# Patient Record
Sex: FEMALE | Race: WHITE | NOT HISPANIC OR LATINO
[De-identification: names, ages, dates, MRNs, and addresses within clinical notes are randomized per-mention and may not be internally consistent; named-entity substitution may affect disease eponyms.]

---

## 2017-08-01 ENCOUNTER — APPOINTMENT (OUTPATIENT)
Dept: BREAST CENTER | Facility: CLINIC | Age: 72
End: 2017-08-01
Payer: MEDICARE

## 2017-08-01 VITALS
WEIGHT: 128 LBS | BODY MASS INDEX: 21.59 KG/M2 | HEIGHT: 64.5 IN | SYSTOLIC BLOOD PRESSURE: 110 MMHG | HEART RATE: 68 BPM | DIASTOLIC BLOOD PRESSURE: 84 MMHG

## 2017-08-01 PROCEDURE — 99213 OFFICE O/P EST LOW 20 MIN: CPT

## 2017-08-01 RX ORDER — BIOTIN 10 MG
TABLET ORAL
Refills: 0 | Status: ACTIVE | COMMUNITY

## 2017-08-05 ENCOUNTER — TRANSCRIPTION ENCOUNTER (OUTPATIENT)
Age: 72
End: 2017-08-05

## 2017-12-15 ENCOUNTER — RESULT REVIEW (OUTPATIENT)
Age: 72
End: 2017-12-15

## 2018-02-07 ENCOUNTER — RESULT REVIEW (OUTPATIENT)
Age: 73
End: 2018-02-07

## 2019-02-23 ENCOUNTER — EMERGENCY (EMERGENCY)
Facility: HOSPITAL | Age: 74
LOS: 1 days | Discharge: HOME | End: 2019-02-23
Attending: EMERGENCY MEDICINE
Payer: MEDICARE

## 2019-02-23 VITALS
TEMPERATURE: 99 F | OXYGEN SATURATION: 100 % | HEART RATE: 97 BPM | RESPIRATION RATE: 18 BRPM | SYSTOLIC BLOOD PRESSURE: 124 MMHG | DIASTOLIC BLOOD PRESSURE: 61 MMHG

## 2019-02-23 VITALS
OXYGEN SATURATION: 97 % | HEART RATE: 101 BPM | SYSTOLIC BLOOD PRESSURE: 158 MMHG | TEMPERATURE: 98 F | DIASTOLIC BLOOD PRESSURE: 82 MMHG | RESPIRATION RATE: 13 BRPM

## 2019-02-23 LAB
ALBUMIN SERPL ELPH-MCNC: 4.7 G/DL — SIGNIFICANT CHANGE UP (ref 3.3–5)
ALP SERPL-CCNC: 41 U/L — SIGNIFICANT CHANGE UP (ref 40–120)
ALT FLD-CCNC: 32 U/L — SIGNIFICANT CHANGE UP (ref 10–45)
ANION GAP SERPL CALC-SCNC: 15 MMOL/L — SIGNIFICANT CHANGE UP (ref 5–17)
APTT BLD: 32.1 SEC — SIGNIFICANT CHANGE UP (ref 27.5–36.3)
AST SERPL-CCNC: 30 U/L — SIGNIFICANT CHANGE UP (ref 10–40)
BASOPHILS # BLD AUTO: 0 K/UL — SIGNIFICANT CHANGE UP (ref 0–0.2)
BASOPHILS NFR BLD AUTO: 0.9 % — SIGNIFICANT CHANGE UP (ref 0–2)
BILIRUB SERPL-MCNC: 0.5 MG/DL — SIGNIFICANT CHANGE UP (ref 0.2–1.2)
BUN SERPL-MCNC: 13 MG/DL — SIGNIFICANT CHANGE UP (ref 7–23)
CALCIUM SERPL-MCNC: 10.8 MG/DL — HIGH (ref 8.4–10.5)
CHLORIDE SERPL-SCNC: 102 MMOL/L — SIGNIFICANT CHANGE UP (ref 96–108)
CO2 SERPL-SCNC: 26 MMOL/L — SIGNIFICANT CHANGE UP (ref 22–31)
CREAT SERPL-MCNC: 0.73 MG/DL — SIGNIFICANT CHANGE UP (ref 0.5–1.3)
EOSINOPHIL # BLD AUTO: 0.1 K/UL — SIGNIFICANT CHANGE UP (ref 0–0.5)
EOSINOPHIL NFR BLD AUTO: 2.6 % — SIGNIFICANT CHANGE UP (ref 0–6)
GLUCOSE SERPL-MCNC: 120 MG/DL — HIGH (ref 70–99)
HCT VFR BLD CALC: 43.6 % — SIGNIFICANT CHANGE UP (ref 34.5–45)
HGB BLD-MCNC: 14.9 G/DL — SIGNIFICANT CHANGE UP (ref 11.5–15.5)
INR BLD: 0.94 RATIO — SIGNIFICANT CHANGE UP (ref 0.88–1.16)
LYMPHOCYTES # BLD AUTO: 1.2 K/UL — SIGNIFICANT CHANGE UP (ref 1–3.3)
LYMPHOCYTES # BLD AUTO: 29 % — SIGNIFICANT CHANGE UP (ref 13–44)
MCHC RBC-ENTMCNC: 30.7 PG — SIGNIFICANT CHANGE UP (ref 27–34)
MCHC RBC-ENTMCNC: 34.2 GM/DL — SIGNIFICANT CHANGE UP (ref 32–36)
MCV RBC AUTO: 89.7 FL — SIGNIFICANT CHANGE UP (ref 80–100)
MONOCYTES # BLD AUTO: 0.4 K/UL — SIGNIFICANT CHANGE UP (ref 0–0.9)
MONOCYTES NFR BLD AUTO: 9.7 % — SIGNIFICANT CHANGE UP (ref 2–14)
NEUTROPHILS # BLD AUTO: 2.4 K/UL — SIGNIFICANT CHANGE UP (ref 1.8–7.4)
NEUTROPHILS NFR BLD AUTO: 57.8 % — SIGNIFICANT CHANGE UP (ref 43–77)
PLATELET # BLD AUTO: 180 K/UL — SIGNIFICANT CHANGE UP (ref 150–400)
POTASSIUM SERPL-MCNC: 3.7 MMOL/L — SIGNIFICANT CHANGE UP (ref 3.5–5.3)
POTASSIUM SERPL-SCNC: 3.7 MMOL/L — SIGNIFICANT CHANGE UP (ref 3.5–5.3)
PROT SERPL-MCNC: 8 G/DL — SIGNIFICANT CHANGE UP (ref 6–8.3)
PROTHROM AB SERPL-ACNC: 10.7 SEC — SIGNIFICANT CHANGE UP (ref 10–12.9)
RBC # BLD: 4.86 M/UL — SIGNIFICANT CHANGE UP (ref 3.8–5.2)
RBC # FLD: 12.2 % — SIGNIFICANT CHANGE UP (ref 10.3–14.5)
SODIUM SERPL-SCNC: 143 MMOL/L — SIGNIFICANT CHANGE UP (ref 135–145)
WBC # BLD: 4.1 K/UL — SIGNIFICANT CHANGE UP (ref 3.8–10.5)
WBC # FLD AUTO: 4.1 K/UL — SIGNIFICANT CHANGE UP (ref 3.8–10.5)

## 2019-02-23 PROCEDURE — 80053 COMPREHEN METABOLIC PANEL: CPT

## 2019-02-23 PROCEDURE — 70450 CT HEAD/BRAIN W/O DYE: CPT

## 2019-02-23 PROCEDURE — 70498 CT ANGIOGRAPHY NECK: CPT | Mod: 26

## 2019-02-23 PROCEDURE — 70498 CT ANGIOGRAPHY NECK: CPT

## 2019-02-23 PROCEDURE — 85610 PROTHROMBIN TIME: CPT

## 2019-02-23 PROCEDURE — 93010 ELECTROCARDIOGRAM REPORT: CPT

## 2019-02-23 PROCEDURE — 70496 CT ANGIOGRAPHY HEAD: CPT

## 2019-02-23 PROCEDURE — 85027 COMPLETE CBC AUTOMATED: CPT

## 2019-02-23 PROCEDURE — 70496 CT ANGIOGRAPHY HEAD: CPT | Mod: 26

## 2019-02-23 PROCEDURE — 99291 CRITICAL CARE FIRST HOUR: CPT | Mod: 25

## 2019-02-23 PROCEDURE — 99291 CRITICAL CARE FIRST HOUR: CPT

## 2019-02-23 PROCEDURE — 70450 CT HEAD/BRAIN W/O DYE: CPT | Mod: 26,59

## 2019-02-23 PROCEDURE — 85730 THROMBOPLASTIN TIME PARTIAL: CPT

## 2019-02-23 PROCEDURE — 93005 ELECTROCARDIOGRAM TRACING: CPT

## 2019-02-23 PROCEDURE — 82962 GLUCOSE BLOOD TEST: CPT

## 2019-02-23 NOTE — ED PROVIDER NOTE - OBJECTIVE STATEMENT
Resident: 73y F PM HLD presents with tongue curling. Per patient, she was seated at her desk at 10:30 when she noticed her tongue curling. Denies slurred speech, headache, visual changes, weakness, numbness or tingling. Went to urgent care, who sent her to emergency department for stroke eval.

## 2019-02-23 NOTE — ED PROVIDER NOTE - PROGRESS NOTE DETAILS
Resident: CTH and CTA negative. Per neurology, will dc home with neuro follow-up. Resident: CTH and CTA negative. Tolerating PO here. Per neurology, will dc home with neuro follow-up.

## 2019-02-23 NOTE — CONSULT NOTE ADULT - ASSESSMENT
73 year old RH  female with PMHx of HLD (on statin, compliant), depression/anxiety who presents to ED as code stroke at 1:15 PM on 2/23/19 for "curling of tongue" and bilateral outer tongue numbness, LKN was 1030 AM on 2/23/19, she noticed she was speaking on the phone and she was able to speak but she felt like her tongue was curling, sx resolved 1/2 hour later. She states she was able to say words properly, and her speech was fluent. No other focal deficits. She is not on any antiplatelets/anticoag, she has no stroke hx. No recent head injury, no headache, dizziness. She felt nauseous earlier but no vomiting, this has also subsided. She stated that she was very stressed when she was on the phone, was dealing with a customer-sensitive issue when this episode occured  She drinks socially only, quit smoking 40 years ago  NIHSS 0, MRS 0  Neurologic exam is unremarkable, nonfocal. Patient states her symptoms have resolved completely. She was given the option of staying in CDU and getting MRI, but would like to leave. Was explained that this is very likely due to anxiety and less likely stroke. She understood and will come back to ED or follow up in stroke clinic PRN.     Plan  [] follow up official read of CTA head and neck  [] continue home medications, no change  [] follow up PRN in stroke neuro clinic    D/w Dr Dandre Olivas

## 2019-02-23 NOTE — ED ADULT NURSE REASSESSMENT NOTE - NS ED NURSE REASSESS COMMENT FT1
Patient PO challenge per MD order, will reassess within appropriate timeframe. VSS patient updated on plan of care.

## 2019-02-23 NOTE — ED PROVIDER NOTE - NSFOLLOWUPINSTRUCTIONS_ED_ALL_ED_FT
Stay well-hydrated. Follow-up with your primary care doctor. Follow-up with neurology in 1-2 weeks. Return immediately to the emergency department if any worsening or concerning symptoms.

## 2019-02-23 NOTE — ED PROVIDER NOTE - CARE PROVIDER_API CALL
Nataliya Gann (NP; RN)  NP in Family Health  [address] [facility] [address], [facility] [address]  [place] [address]  Phone: [phone]  Fax: [phone]  Follow Up Time:

## 2019-02-23 NOTE — CONSULT NOTE ADULT - SUBJECTIVE AND OBJECTIVE BOX
HPI:  Patient is a 73 year old RH  female with PMHx of HLD, depression who presents to ED as code stroke at 1:15 PM on 2/23/19 for slurring of speech and bilateral outer tongue numbness, LKN was 1030 AM on 2/23/19, she noticed she was speaking on the phone and she was able to speak but she had slurring of speech which resolved approx half an hour later    MEDICATIONS  (STANDING):    MEDICATIONS  (PRN):    PAST MEDICAL & SURGICAL HISTORY:    FAMILY HISTORY:    Allergies    No Known Allergies    Intolerances        SHx - No smoking, No ETOH, No drug abuse    Review of Systems:  CONSTITUTIONAL:  No weight loss, fever, chills, weakness or fatigue.  HEENT:  Eyes:  No visual loss, blurred vision, double vision or yellow sclerae. Ears, Nose, Throat:  No hearing loss, sneezing, congestion, runny nose or sore throat.  CARDIOVASCULAR:  No chest pain, chest pressure or chest discomfort. No palpitations or edema.  GASTROINTESTINAL:  No anorexia, nausea, vomiting or diarrhea. No abdominal pain or blood.  NEUROLOGICAL: See HPI  MUSCULOSKELETAL:  No muscle, back pain, joint pain or stiffness.  PSYCHIATRIC:  No history of depression or anxiety.    Vital Signs Last 24 Hrs  T(C): --  T(F): --  HR: --  BP: --  BP(mean): --  RR: --  SpO2: --    General Exam:   General appearance: No acute distress                 Neurological Exam:  Mental Status: Orientated to self, date and place.    No dysarthria, aphasia or neglect.      Cranial Nerves: CN I - not tested.  PERRL, EOMI, VFF, no nystagmus or diplopia.    No facial asymmetry.  Hearing intact to finger rub bilaterally.     Motor:   Tone: normal.                  Strength:     [] Upper extremity                      Delt       Bicep    Tricep                                                  R         5/5        5/5        5/5       5/5                                               L          5/5        5/5        5/5       5/5  [] Lower extremity                       HF          KE          KF        DF         PF                                               R        5/5        5/5        5/5       5/5       5/5                                               L         5/5        5/5       5/5       5/5        5/5  Pronator drift: none                 Dysmetria: BL NL FTN  No truncal ataxia.    Tremor: No resting, postural or action tremor.  No myoclonus.    Sensation: intact to light touch, pinprick, vibration and proprioception    Deep Tendon Reflexes:   Right 2+ : BC, TC, BRD   Left 2+ : BC, TC, BRD  Right 2+ Knee, 1+ ankle  Left 2+ Knee, 1+ ankle    Toes flexor bilaterally  Gait: normal and stable.      Other:  Radiology  CT  MRI  EKG:  tele:  TTE:  EEG: HPI:  Patient is a 73 year old RH  female with PMHx of HLD, depression/anxiety who presents to ED as code stroke at 1:15 PM on 2/23/19 for "curling of tongue" and bilateral outer tongue numbness, LKN was 1030 AM on 2/23/19, she noticed she was speaking on the phone and she was able to speak but she felt like her tongue was curling, sx resolved 1/2 hour later. No other focal deficits. She is not on any antiplatelets/anticoag, she has no stroke hx. No recent head injury, no headache, dizziness. She felt nauseous earlier but no vomiting, this has also subsided.   She drinks socially only, quit smoking 40 years ago  NIHSS 0, MRS 0    MEDICATIONS  (STANDING):    MEDICATIONS  (PRN):    PAST MEDICAL & SURGICAL HISTORY:    FAMILY HISTORY:    Allergies    No Known Allergies    Intolerances        SHx - No smoking, No ETOH, No drug abuse    Review of Systems:  CONSTITUTIONAL:  No weight loss, fever, chills, weakness or fatigue.  HEENT:  Eyes:  No visual loss, blurred vision, double vision or yellow sclerae. Ears, Nose, Throat:  No hearing loss, sneezing, congestion, runny nose or sore throat.  CARDIOVASCULAR:  No chest pain, chest pressure or chest discomfort. No palpitations or edema.  GASTROINTESTINAL:  No anorexia, nausea, vomiting or diarrhea. No abdominal pain or blood.  NEUROLOGICAL: See HPI  MUSCULOSKELETAL:  No muscle, back pain, joint pain or stiffness.  PSYCHIATRIC:  No history of depression or anxiety.    Vital Signs Last 24 Hrs  T(C): --  T(F): --  HR: --  BP: --  BP(mean): --  RR: --  SpO2: --    General Exam:   General appearance: No acute distress                 Neurological Exam:  Mental Status: Orientated to self, date and place.    No dysarthria, aphasia or neglect.      Cranial Nerves: CN I - not tested.  PERRL, EOMI, VFF, no nystagmus or diplopia.    No facial asymmetry.  Hearing intact to finger rub bilaterally.     Motor:   Tone: normal.                  Strength:     [] Upper extremity                      Delt       Bicep    Tricep                                                  R         5/5        5/5        5/5       5/5                                               L          5/5        5/5        5/5       5/5  [] Lower extremity                       HF          KE          KF        DF         PF                                               R        5/5        5/5        5/5       5/5       5/5                                               L         5/5        5/5       5/5       5/5        5/5  Pronator drift: none                 Dysmetria: BL NL FTN  No truncal ataxia.    Tremor: No resting, postural or action tremor.  No myoclonus.    Sensation: intact to light touch, pinprick, vibration and proprioception    Deep Tendon Reflexes:   Right 2+ : BC, TC, BRD   Left 2+ : BC, TC, BRD  Right 2+ Knee, 1+ ankle  Left 2+ Knee, 1+ ankle    Toes flexor bilaterally  Gait: normal and stable.      Other:  Radiology  CT  MRI  EKG:  tele:  TTE:  EEG: HPI:  Patient is a 73 year old RH  female with PMHx of HLD, depression/anxiety who presents to ED as code stroke at 1:15 PM on 2/23/19 for "curling of tongue" and bilateral outer tongue numbness, LKN was 1030 AM on 2/23/19, she noticed she was speaking on the phone and she was able to speak but she felt like her tongue was curling, sx resolved 1/2 hour later. No other focal deficits. She is not on any antiplatelets/anticoag, she has no stroke hx. No recent head injury, no headache, dizziness. She felt nauseous earlier but no vomiting, this has also subsided. She stated that she was very stressed when she was on the phone, was dealing with a customer-sensitive issue when this episode occured  She drinks socially only, quit smoking 40 years ago  NIHSS 0, MRS 0    Allergies  No Known Allergies    Review of Systems:  CONSTITUTIONAL:  No weight loss, fever, chills, weakness or fatigue.  HEENT:  Eyes:  No visual loss, blurred vision, double vision or yellow sclerae. Ears, Nose, Throat:  No hearing loss, sneezing, congestion, runny nose or sore throat.  CARDIOVASCULAR:  No chest pain, chest pressure or chest discomfort. No palpitations or edema.  GASTROINTESTINAL:  No anorexia, nausea, vomiting or diarrhea. No abdominal pain or blood.  NEUROLOGICAL: See HPI  MUSCULOSKELETAL:  No muscle, back pain, joint pain or stiffness.  PSYCHIATRIC:  No history of depression or anxiety.    General Exam:   General appearance: No acute distress                 Neurological Exam:  Mental Status: Orientated to self, date and place.    No dysarthria, aphasia or neglect. Speech is fluent. She is able to say phrases and name objects, able to follow commands.       Cranial Nerves: CN I - not tested.  PERRL, EOMI, VFF, no nystagmus  No facial asymmetry.       Motor:   Tone: normal.                  Strength:     [] Upper extremity                      Delt       Bicep    Tricep                                                  R         5/5        5/5        5/5       5/5                                               L          5/5        5/5        5/5       5/5  [] Lower extremity                       HF          KE          KF        DF         PF                                               R        5/5        5/5        5/5       5/5       5/5                                               L         5/5        5/5       5/5       5/5        5/5  Pronator drift: none                 Dysmetria: BL NL FTN  No truncal ataxia.    Tremor: No resting, postural or action tremor.  No myoclonus.    Sensation: intact to light touch  Toes flexor bilaterally  Gait: normal and stable.      Other:  Radiology  CTh: negative

## 2019-02-23 NOTE — ED PROVIDER NOTE - PHYSICAL EXAMINATION
Resident:   Gen: well appearing, of stated age, no acute distress  Head: NC, AT  ENT: PERRL, MMM  Neck: supple with full ROM   Chest: CTAB, no retractions, rate normal, appears to breathe comfortably  Heart: RRR S1S2, No peripheral edema, bilateral pulses in arms and legs  Abd: Soft non-tender, no rebound or guarding  Back: No spinal deformity, no CVAT  Ext: Moving all 4 extremities without obvious impairment to ROM, no obvious weakness  Neuro: fluid speech, no focal deficits, normal sensation  Psych: No anxiety, depression or pressured speech noted  Skin: no urticaria, no diffuse rash

## 2019-02-23 NOTE — ED PROVIDER NOTE - NS ED ROS FT
Constitutional: no fever, no chills.  Eyes: no visual changes.  ENMT: no sore throat.  CV: no chest pain.  Resp: no cough, no shortness of breath.  GI: no abdominal pain, no nausea, no vomiting, no diarrhea.  : no dysuria, no hematuria.  MSK: no back pain, no neck pain.  Skin: no rashes.  Neuro: no headache, no loss of consciousness, no weakness, no numbness, no tingling.  Psych: no known mental health issues.  Endo: no diabetes, no thyroid trouble.

## 2019-02-23 NOTE — ED PROVIDER NOTE - ATTENDING CONTRIBUTION TO CARE
------------ATTENDING NOTE------------   RHD pt w/  sent to ED by Urgent Care for concerns of CVA, pt describes sudden difficult speaking due to tongue "feeling funny" at 11:30AM today, felt her words where mumbled, no inappropriate words, slight improving symptoms by ED arrival, NIHSS = 1 on ED arrival for slight dysarthria, Code Stroke on arrival, awaiting labs/imaging and close reassessments and Neuro recs -->  - Joe Guerrero MD   ---------------------------------------------- ------------ATTENDING NOTE------------   RHD pt w/  sent to ED by Urgent Care for concerns of CVA, pt describes sudden difficult speaking due to tongue "feeling funny" at 11:30AM today, felt her words where mumbled, no inappropriate words, slight improving symptoms by ED arrival, NIHSS = 1 on ED arrival for slight dysarthria, Code Stroke on arrival, awaiting labs/imaging and close reassessments and Neuro recs --> back to baseline, low suspicion for CVA per Neuro, nml VS, tolerating PO, nml oral exam, in depth dw all about ddx, tx, bonner, continued close outpt fu.  - Joe Guerrero MD   ----------------------------------------------

## 2019-02-23 NOTE — ED ADULT NURSE NOTE - OBJECTIVE STATEMENT
73 year old A&Ox3 female presents ambulatory to ED with steady gait complaining of an episode of slurred speech this morning at 1030 for approximately 30 minutes. Patient states that the slurring has since resolved but she continues to have numbness on either side of her tongue. Code stroke called at 1316. Patient to CT at 1325. Speech is coherent, intonation clear, PERRL +3R equal reactive bilaterally. 73 year old A&Ox3 female presents ambulatory to ED with steady gait complaining of an episode of slurred speech this morning at 1030 for approximately 30 minutes. Patient states that the slurring has since resolved but she continues to have numbness on either side of her tongue. Code stroke called at 1316. Patient to CT at 1325. Speech is coherent, intonation clear, PERRL +3R equal reactive bilaterally. Full ROM, sensation intact. Confirms left arm numbness, mild headache, denies CP, SOB, n/v/d, fevers, chills, blurry vision, dizziness, tingling in bilateral upper and lower extremities. Refer to stroke neuro sheet for further documentation. Patient and family updated on plan of care.

## 2019-02-23 NOTE — ED PROVIDER NOTE - NSFOLLOWUPCLINICS_GEN_ALL_ED_FT
[facility]  Neurology  [address] [place] [address] [address] [place] [address]  Phone: [phone]  Fax:   Follow Up Time:

## 2019-02-23 NOTE — ED ADULT NURSE NOTE - CHPI ED NUR SYMPTOMS NEG
no confusion/no dizziness/no nausea/no blurred vision/no weakness/no fever/no loss of consciousness/no change in level of consciousness/no vomiting

## 2019-03-22 ENCOUNTER — RESULT REVIEW (OUTPATIENT)
Age: 74
End: 2019-03-22

## 2019-09-13 ENCOUNTER — APPOINTMENT (OUTPATIENT)
Dept: BREAST CENTER | Facility: CLINIC | Age: 74
End: 2019-09-13
Payer: MEDICARE

## 2019-09-13 VITALS
DIASTOLIC BLOOD PRESSURE: 65 MMHG | HEART RATE: 60 BPM | HEIGHT: 64 IN | BODY MASS INDEX: 22.02 KG/M2 | SYSTOLIC BLOOD PRESSURE: 121 MMHG | WEIGHT: 129 LBS

## 2019-09-13 DIAGNOSIS — Z12.39 ENCOUNTER FOR OTHER SCREENING FOR MALIGNANT NEOPLASM OF BREAST: ICD-10-CM

## 2019-09-13 DIAGNOSIS — R22.9 LOCALIZED SWELLING, MASS AND LUMP, UNSPECIFIED: ICD-10-CM

## 2019-09-13 PROCEDURE — 99213 OFFICE O/P EST LOW 20 MIN: CPT

## 2019-09-13 RX ORDER — ESTRADIOL 0.1 MG/G
0.1 CREAM VAGINAL
Qty: 42 | Refills: 0 | Status: ACTIVE | COMMUNITY
Start: 2018-10-05

## 2019-09-13 NOTE — DATA REVIEWED
[FreeTextEntry1] : Bilateral mammogram with alberto 5/28/2019:  No evidence of malignancy.\par \par Bilateral breast ultrasound 5/28/2019:  Right axillary region stable 29p0l14 mm well circumscribed oval mass versus 2015.\par \par \par (The current images were reviewed on disc and returned to the patient.)\par \par

## 2019-09-13 NOTE — PHYSICAL EXAM
[Sclera nonicteric] : sclera nonicteric [Supple] : supple [No Supraclavicular Adenopathy] : no supraclavicular adenopathy [No Cervical Adenopathy] : no cervical adenopathy [No Thyromegaly] : no thyromegaly [Clear to Auscultation Bilat] : clear to auscultation bilaterally [Examined in the supine and seated position] : examined in the supine and seated position [No dominant masses] : no dominant masses left breast [No Nipple Retraction] : no left nipple retraction [No Nipple Discharge] : no left nipple discharge [Not Tender] : non-tender [No Axillary Lymphadenopathy] : no left axillary lymphadenopathy [Soft] : abdomen soft [No Palpable Masses] : no abdominal mass palpated [No Hepato-Splenomegaly] : no hepato-splenomegaly [de-identified] : 1 cm mobile oval mass upper lateral breast toward pectoral insertion without change.

## 2019-09-13 NOTE — HISTORY OF PRESENT ILLNESS
[FreeTextEntry1] : This is a 74 year old  [employer] female who was seen in March 2015 for a small lump near her right armpit that had been present for 4-5 years.  She thought it had gotten a little larger. An FNA showed bloody fluid and it was felt to likely be a hemangioma. She also had a finding on the left mammogram which was stable on follow-up.  \par \par She has no current complaints.\par \par

## 2019-09-13 NOTE — PAST MEDICAL HISTORY
[Menarche Age ____] : age at menarche was [unfilled] [Menopause Age____] : age at menopause was [unfilled] [Live Births ___] : P[unfilled]  [Total Preg ___] : G[unfilled] [Age At Live Birth ___] : Age at live birth: [unfilled] [FreeTextEntry8] : no [FreeTextEntry7] : 5 years

## 2020-09-09 ENCOUNTER — RESULT REVIEW (OUTPATIENT)
Age: 75
End: 2020-09-09

## 2021-09-14 ENCOUNTER — RESULT REVIEW (OUTPATIENT)
Age: 76
End: 2021-09-14

## 2022-09-16 ENCOUNTER — RESULT REVIEW (OUTPATIENT)
Age: 77
End: 2022-09-16

## 2022-10-11 ENCOUNTER — NON-APPOINTMENT (OUTPATIENT)
Age: 77
End: 2022-10-11

## 2022-10-11 ENCOUNTER — TRANSCRIPTION ENCOUNTER (OUTPATIENT)
Age: 77
End: 2022-10-11

## 2022-10-12 ENCOUNTER — APPOINTMENT (OUTPATIENT)
Dept: SURGERY | Facility: CLINIC | Age: 77
End: 2022-10-12
Payer: MEDICARE

## 2022-10-12 VITALS
HEIGHT: 64 IN | SYSTOLIC BLOOD PRESSURE: 120 MMHG | BODY MASS INDEX: 22.88 KG/M2 | OXYGEN SATURATION: 99 % | WEIGHT: 134 LBS | DIASTOLIC BLOOD PRESSURE: 76 MMHG | TEMPERATURE: 97 F | HEART RATE: 72 BPM

## 2022-10-12 DIAGNOSIS — K80.20 CALCULUS OF GALLBLADDER W/OUT CHOLECYSTITIS W/OUT OBSTRUCTION: ICD-10-CM

## 2022-10-12 PROCEDURE — 99204 OFFICE O/P NEW MOD 45 MIN: CPT

## 2022-10-12 RX ORDER — MELOXICAM 15 MG/1
15 TABLET ORAL
Qty: 30 | Refills: 0 | Status: DISCONTINUED | COMMUNITY
Start: 2019-07-21 | End: 2022-10-12

## 2022-10-12 RX ORDER — METHYLPREDNISOLONE 4 MG/1
4 TABLET ORAL
Qty: 21 | Refills: 0 | Status: DISCONTINUED | COMMUNITY
Start: 2019-07-21 | End: 2022-10-12

## 2022-10-12 RX ORDER — NITROFURANTOIN (MONOHYDRATE/MACROCRYSTALS) 25; 75 MG/1; MG/1
100 CAPSULE ORAL
Qty: 10 | Refills: 0 | Status: DISCONTINUED | COMMUNITY
Start: 2019-08-21 | End: 2022-10-12

## 2022-10-31 ENCOUNTER — APPOINTMENT (OUTPATIENT)
Dept: SURGERY | Facility: CLINIC | Age: 77
End: 2022-10-31

## 2022-10-31 VITALS
TEMPERATURE: 98.2 F | BODY MASS INDEX: 22.88 KG/M2 | DIASTOLIC BLOOD PRESSURE: 79 MMHG | SYSTOLIC BLOOD PRESSURE: 152 MMHG | OXYGEN SATURATION: 96 % | WEIGHT: 134 LBS | HEIGHT: 64 IN | HEART RATE: 79 BPM

## 2022-10-31 DIAGNOSIS — Z86.39 PERSONAL HISTORY OF OTHER ENDOCRINE, NUTRITIONAL AND METABOLIC DISEASE: ICD-10-CM

## 2022-10-31 PROCEDURE — 99202 OFFICE O/P NEW SF 15 MIN: CPT

## 2022-10-31 NOTE — ASSESSMENT
[FreeTextEntry1] : Pt had an MRI 2016 that showed a 7.4 cm hemangioma and the present sonogram shows a 1.6 cm liver lesion?\par I would therefore like to repeat her liver MRI now

## 2022-10-31 NOTE — PHYSICAL EXAM
[JVD] : no jugular venous distention  [Normal Thyroid] : the thyroid was normal [Carotid Bruits] : no carotid bruits [Normal Breath Sounds] : Normal breath sounds [Normal Heart Sounds] : normal heart sounds [Normal Rate and Rhythm] : normal rate and rhythm [No Rash or Lesion] : No rash or lesion [Alert] : alert [Oriented to Person] : oriented to person [Oriented to Place] : oriented to place [Oriented to Time] : oriented to time [Calm] : calm [de-identified] : well developed female in no distress [de-identified] : nonicteric  [de-identified] : without adenopathy [de-identified] : normal bowel sounds, without distension or tenderness at time of exam [de-identified] : without hernia [de-identified] : without calf pain or swelling
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
## 2022-11-03 ENCOUNTER — APPOINTMENT (OUTPATIENT)
Dept: SURGERY | Facility: CLINIC | Age: 77
End: 2022-11-03

## 2022-11-03 VITALS — TEMPERATURE: 98.1 F

## 2022-11-03 DIAGNOSIS — K80.12 CALCULUS OF GALLBLADDER WITH ACUTE AND CHRONIC CHOLECYSTITIS W/OUT OBSTRUCTION: ICD-10-CM

## 2022-11-03 DIAGNOSIS — R16.0 HEPATOMEGALY, NOT ELSEWHERE CLASSIFIED: ICD-10-CM

## 2022-11-03 PROCEDURE — 99214 OFFICE O/P EST MOD 30 MIN: CPT

## 2022-11-03 NOTE — ASSESSMENT
[FreeTextEntry1] : I have discussed with pt and her  that the MRI shows that her liver lesion is a benign hemangioma and her adrenal lesion also looks benign and therefore they both do not require any intervention.\par I have discussed that she should remain on a no fat diet to avoid further gallbladder attacks.\par I have also discussed the risks, benefits and options. Pt would like to schedule a robotic assisted laparoscopic cholecystectomy.

## 2022-11-03 NOTE — HISTORY OF PRESENT ILLNESS
OT IRP Treatment    Primary Rehabilitation Diagnosis: spinal cord injury        Planned Discharge Destination: Home     SUBJECTIVE: Subjective: Pt was agreeable to therapy (01/24/22 1400)       Patient's Personal Goal: return home     OBJECTIVE:  Precautions  Cervical Precautions: Yes (01/24/22 1400)  Other Precautions: fall (01/24/22 0830)  Precautions Comments: Therapist re-educated pt on spinal precautions (01/24/22 1100)    Pain Intensity  Numeric Rating Scale 0-10: 0  Faces Scale: 0    See below for current functional status overview.  See OT flowsheet for full details regarding the OT therapy provided.    Therapist wearing surgical mask during entire session.    Patient was wearing mask throughout duration of therapy session.         ASSESSMENT:    Treatment today focused on bed mobility, dressing, and functional transfers.  Patient is demonstrating fair progress supported by motivation to participate.  Patient limited at this time by decreased functional mobility and cervical precautions.  Patient will benefit from further skilled OT for continued training with use of AE for dressing and increasing activity tolerance to help the patient meet goal of completing some of his ADLs with as little assist as possible.           This patient participated in all scheduled occupational therapy time with this therapist today.     Education:     Education Provided  On this date, education was provided to patient regarding safe use of equipment, self-care activities and energy conservation/pacing strategies. The response to education was/were: Verbalizes understanding.    LONG TERM GOALS:    Dressing Discharge Goal 1: patient will perform UE dressing setup  Dressing Discharge Goal 2: patient will perform LE dressing MODA-POOJA  Toileting Discharge Goal 1: patient will perform toileting MODA-POOJA  Home Setting Transfer Discharge Goal 1: patient will perform toilet transfer POOJA     PLAN:   Continue skilled OT, including the  [de-identified] : cholecystitis and lithiasis, hx of hepatic mass(hemangioma?) following Treatment Interventions: ADL retraining;Functional transfer training;UE strengthening/ROM;Endurance training;Cognitive reorientation;Patient/Family training;Equipment eval/education (01/22/22 1300)   OT Frequency: 5 days/week;6 days/week;7 days/week (01/22/22 1300), Frequency Comments: 1-1.5hrs/day (01/22/22 1300)                  RECOMMENDATIONS FOR DISCHARGE:  Recommendations for Discharge: OT IL: Patient requires 24 HOUR assistance to perform mobility and/or ADLs safely, Patient requires  intermittent assistance to perform mobility and/or ADLs safely, Patient is appropriate for Occupational Therapy 1-3 times per week         PT/OT ADL Equipment for Discharge: has BSC, shower chair (01/22/22 1300)       FUNCTIONAL DATA OVERVIEW LAST 24 HOURS  ADLs   Self Cares/ADL's  Grooming Assistance: Supervision;Supine, bed (01/24/22 1100)  Grooming/Oral Hygiene Deficit: Supervision/Safety (01/24/22 1100)  Upper Body Dressing Assistance: Minimal Assist (Min) (01/24/22 1400)  Upper Body Dressing Deficit: Pull down in back;Pull around back (01/24/22 1400)  Lower Body Clothing Assistance: Total Assist - Dependent;Wheelchair (Mary Steady) (01/24/22 1400)  Footwear Assistance: Total Assist - Non-dependent (while supine in bed) (01/24/22 0830)  Lower Body Dressing Deficit: Supervision/Safety;Requires assistive device for steadying (spinal precaution (no bending)) (01/24/22 0830)  Dressing Equipment Used: Reacher (01/24/22 1400)  Self Cares/ADL's Comments #1: Pt educated on use of reacher to maintain cervical precautions during dressing tasks (01/24/22 1400)     Household mobility  Household Mobility  Rolling: Minimal Assist (Min) (01/24/22 1400)  Supine to Sit: Total Assist - Dependent (2 person A; helper 1 UB/trunk, helper 2 BLE) (01/24/22 1400)  Sit to Stand: Total Assist - Dependent (Mary Steady) (01/24/22 1400)  Sitting - Static: Minimal Assist (Min) (01/24/22 1400)  Sitting - Dynamic: Minimal Assist (Min) (01/24/22  1400)  Household Mobility Comments #1: Pt completed rolling onto his side while following log roll technique to increase bed mobility and increase awareness of spinal precautions (01/24/22 1100)     Home Management        Tolerance  OT Activity Tolerance  Activity Tolerance: 1:1 Activity to rest (01/24/22 1400)     Cognition        Vision  @FLOW ([id number])     Interventions  Other Interventions 1: Scategories game completed where pt was tasked to think of words begining with \"S\" that fit into 10 categories. Min verbal cues required for 3/10 categories to further assess pt's memory and problem solving (01/24/22 1100)             At the end of the therapy session, patient is in wheelchair with the following safety measures in place: alarm system in place/re-engaged and call light within reach.    Patient is located in the patient room and was handed off to RN. Patient's family was not present. .   [de-identified] : Pt is here to go over her Liver MRI that showed a Hemangioma and a benign adrenal lesion. She denies any further symptoms

## 2022-11-03 NOTE — PHYSICAL EXAM
[JVD] : no jugular venous distention  [Normal Thyroid] : the thyroid was normal [Carotid Bruits] : no carotid bruits [Normal Breath Sounds] : Normal breath sounds [Normal Heart Sounds] : normal heart sounds [Normal Rate and Rhythm] : normal rate and rhythm [No Rash or Lesion] : No rash or lesion [Alert] : alert [Oriented to Person] : oriented to person [Oriented to Place] : oriented to place [Oriented to Time] : oriented to time [Calm] : calm [de-identified] : well developed white female in no distress [de-identified] : nonicteric and noninjected [de-identified] : without adenopathy [de-identified] : normal bowel sounds, without distension or tenderness at time of exam [de-identified] : without calf pain or swelling

## 2022-11-07 ENCOUNTER — OUTPATIENT (OUTPATIENT)
Dept: OUTPATIENT SERVICES | Facility: HOSPITAL | Age: 77
LOS: 1 days | End: 2022-11-07
Payer: MEDICARE

## 2022-11-07 VITALS
HEART RATE: 79 BPM | DIASTOLIC BLOOD PRESSURE: 85 MMHG | OXYGEN SATURATION: 97 % | WEIGHT: 132.94 LBS | HEIGHT: 64.5 IN | RESPIRATION RATE: 16 BRPM | SYSTOLIC BLOOD PRESSURE: 154 MMHG | TEMPERATURE: 98 F

## 2022-11-07 DIAGNOSIS — Z01.818 ENCOUNTER FOR OTHER PREPROCEDURAL EXAMINATION: ICD-10-CM

## 2022-11-07 DIAGNOSIS — Z98.890 OTHER SPECIFIED POSTPROCEDURAL STATES: Chronic | ICD-10-CM

## 2022-11-07 DIAGNOSIS — K80.12 CALCULUS OF GALLBLADDER WITH ACUTE AND CHRONIC CHOLECYSTITIS WITHOUT OBSTRUCTION: ICD-10-CM

## 2022-11-07 LAB
ALBUMIN SERPL ELPH-MCNC: 4 G/DL — SIGNIFICANT CHANGE UP (ref 3.3–5)
ALP SERPL-CCNC: 35 U/L — LOW (ref 40–120)
ALT FLD-CCNC: 39 U/L — SIGNIFICANT CHANGE UP (ref 12–78)
ANION GAP SERPL CALC-SCNC: 6 MMOL/L — SIGNIFICANT CHANGE UP (ref 5–17)
AST SERPL-CCNC: 21 U/L — SIGNIFICANT CHANGE UP (ref 15–37)
BILIRUB SERPL-MCNC: 0.5 MG/DL — SIGNIFICANT CHANGE UP (ref 0.2–1.2)
BUN SERPL-MCNC: 16 MG/DL — SIGNIFICANT CHANGE UP (ref 7–23)
CALCIUM SERPL-MCNC: 9.6 MG/DL — SIGNIFICANT CHANGE UP (ref 8.5–10.1)
CHLORIDE SERPL-SCNC: 107 MMOL/L — SIGNIFICANT CHANGE UP (ref 96–108)
CO2 SERPL-SCNC: 29 MMOL/L — SIGNIFICANT CHANGE UP (ref 22–31)
CREAT SERPL-MCNC: 0.72 MG/DL — SIGNIFICANT CHANGE UP (ref 0.5–1.3)
EGFR: 87 ML/MIN/1.73M2 — SIGNIFICANT CHANGE UP
GLUCOSE SERPL-MCNC: 96 MG/DL — SIGNIFICANT CHANGE UP (ref 70–99)
HCT VFR BLD CALC: 42.9 % — SIGNIFICANT CHANGE UP (ref 34.5–45)
HGB BLD-MCNC: 13.6 G/DL — SIGNIFICANT CHANGE UP (ref 11.5–15.5)
MCHC RBC-ENTMCNC: 29.4 PG — SIGNIFICANT CHANGE UP (ref 27–34)
MCHC RBC-ENTMCNC: 31.7 GM/DL — LOW (ref 32–36)
MCV RBC AUTO: 92.7 FL — SIGNIFICANT CHANGE UP (ref 80–100)
NRBC # BLD: 0 /100 WBCS — SIGNIFICANT CHANGE UP (ref 0–0)
PLATELET # BLD AUTO: 220 K/UL — SIGNIFICANT CHANGE UP (ref 150–400)
POTASSIUM SERPL-MCNC: 3.7 MMOL/L — SIGNIFICANT CHANGE UP (ref 3.5–5.3)
POTASSIUM SERPL-SCNC: 3.7 MMOL/L — SIGNIFICANT CHANGE UP (ref 3.5–5.3)
PROT SERPL-MCNC: 7.6 G/DL — SIGNIFICANT CHANGE UP (ref 6–8.3)
RBC # BLD: 4.63 M/UL — SIGNIFICANT CHANGE UP (ref 3.8–5.2)
RBC # FLD: 13 % — SIGNIFICANT CHANGE UP (ref 10.3–14.5)
SARS-COV-2 RNA SPEC QL NAA+PROBE: SIGNIFICANT CHANGE UP
SODIUM SERPL-SCNC: 142 MMOL/L — SIGNIFICANT CHANGE UP (ref 135–145)
WBC # BLD: 4.18 K/UL — SIGNIFICANT CHANGE UP (ref 3.8–10.5)
WBC # FLD AUTO: 4.18 K/UL — SIGNIFICANT CHANGE UP (ref 3.8–10.5)

## 2022-11-07 PROCEDURE — 36415 COLL VENOUS BLD VENIPUNCTURE: CPT

## 2022-11-07 PROCEDURE — 86901 BLOOD TYPING SEROLOGIC RH(D): CPT

## 2022-11-07 PROCEDURE — 93010 ELECTROCARDIOGRAM REPORT: CPT

## 2022-11-07 PROCEDURE — 86900 BLOOD TYPING SEROLOGIC ABO: CPT

## 2022-11-07 PROCEDURE — 80053 COMPREHEN METABOLIC PANEL: CPT

## 2022-11-07 PROCEDURE — G0463: CPT

## 2022-11-07 PROCEDURE — 93005 ELECTROCARDIOGRAM TRACING: CPT

## 2022-11-07 PROCEDURE — 86850 RBC ANTIBODY SCREEN: CPT

## 2022-11-07 PROCEDURE — 85027 COMPLETE CBC AUTOMATED: CPT

## 2022-11-07 PROCEDURE — 87635 SARS-COV-2 COVID-19 AMP PRB: CPT

## 2022-11-07 NOTE — H&P PST ADULT - PROBLEM SELECTOR PLAN 2
Labs  - CBC, CMP, T&S, EKG and COVID PCR 48-72 before DOS.   MC with Dr. Tse  Pre op and [facility] instructions reviewed and given. Take routine am meds am of surgery with sip of water. No meds to take am of surgery. Instructed to hold and/or avoid other NSAIDs and OTC supplements. Tylenol is ok. Verbalized understanding Labs  - CBC, CMP, T&S, EKG and COVID PCR 48-72 before DOS.   MC with Dr. Tse  [facility]. Take routine am Lexapro, if dose day,  am of surgery with sip of water. Instructed to hold and/or avoid other NSAIDs and OTC supplements. Tylenol is ok. Verbalized understanding
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
## 2022-11-07 NOTE — H&P PST ADULT - DENTITION
implants; Denies dentures, broken and loose teeth implants; Denies dentures, broken and loose teeth/normal

## 2022-11-07 NOTE — H&P PST ADULT - HISTORY OF PRESENT ILLNESS
76 yo female presents to PST scheduled for a robotic assisted laparoscopic cholecystectomy on 11/11 with Dr. Yoder. Reports H/O GB attack with right sided abd pain.  Denies N/V and bowel changes. Dx with 2 gallstones Denies pain. C/O bloating.  76 yo female with anxiety and HLD,  presents to PST scheduled for a robotic assisted laparoscopic cholecystectomy on 11/11 with Dr. Yoder. Reports H/O GB attack with RUQ abd pain.  Denies N/V and bowel changes. Dx with 2 gallstones. Denies pain today. C/O bloating.
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
## 2022-11-07 NOTE — H&P PST ADULT - NSANTHOSAYNRD_GEN_A_CORE
Denies HITESH/No. HITESH screening performed.  STOP BANG Legend: 0-2 = LOW Risk; 3-4 = INTERMEDIATE Risk; 5-8 = HIGH Risk

## 2022-11-07 NOTE — H&P PST ADULT - FALL HARM RISK - UNIVERSAL INTERVENTIONS
Bed in lowest position, wheels locked, appropriate side rails in place/Call bell, personal items and telephone in reach/Instruct patient to call for assistance before getting out of bed or chair/Non-slip footwear when patient is out of bed/[place] to call system/Physically safe environment - no spills, clutter or unnecessary equipment/Purposeful Proactive Rounding/Room/bathroom lighting operational, light cord in reach

## 2022-11-10 ENCOUNTER — TRANSCRIPTION ENCOUNTER (OUTPATIENT)
Age: 77
End: 2022-11-10
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
## 2022-11-10 NOTE — ASU PATIENT PROFILE, ADULT - FALL HARM RISK - UNIVERSAL INTERVENTIONS
Bed in lowest position, wheels locked, appropriate side rails in place/Call bell, personal items and telephone in reach/Instruct patient to call for assistance before getting out of bed or chair/Non-slip footwear when patient is out of bed/[place] to call system/Physically safe environment - no spills, clutter or unnecessary equipment/Purposeful Proactive Rounding/Room/bathroom lighting operational, light cord in reach

## 2022-11-11 ENCOUNTER — APPOINTMENT (OUTPATIENT)
Dept: SURGERY | Facility: HOSPITAL | Age: 77
End: 2022-11-11

## 2022-11-11 ENCOUNTER — OUTPATIENT (OUTPATIENT)
Dept: OUTPATIENT SERVICES | Facility: HOSPITAL | Age: 77
LOS: 1 days | End: 2022-11-11
Payer: MEDICARE

## 2022-11-11 ENCOUNTER — RESULT REVIEW (OUTPATIENT)
Age: 77
End: 2022-11-11

## 2022-11-11 ENCOUNTER — TRANSCRIPTION ENCOUNTER (OUTPATIENT)
Age: 77
End: 2022-11-11

## 2022-11-11 VITALS
OXYGEN SATURATION: 97 % | DIASTOLIC BLOOD PRESSURE: 78 MMHG | RESPIRATION RATE: 16 BRPM | HEART RATE: 95 BPM | TEMPERATURE: 99 F | SYSTOLIC BLOOD PRESSURE: 146 MMHG

## 2022-11-11 VITALS
HEART RATE: 79 BPM | OXYGEN SATURATION: 99 % | DIASTOLIC BLOOD PRESSURE: 60 MMHG | SYSTOLIC BLOOD PRESSURE: 115 MMHG | RESPIRATION RATE: 14 BRPM

## 2022-11-11 DIAGNOSIS — K80.12 CALCULUS OF GALLBLADDER WITH ACUTE AND CHRONIC CHOLECYSTITIS WITHOUT OBSTRUCTION: ICD-10-CM

## 2022-11-11 DIAGNOSIS — Z01.818 ENCOUNTER FOR OTHER PREPROCEDURAL EXAMINATION: ICD-10-CM

## 2022-11-11 DIAGNOSIS — Z98.890 OTHER SPECIFIED POSTPROCEDURAL STATES: Chronic | ICD-10-CM

## 2022-11-11 LAB — ABO RH CONFIRMATION: SIGNIFICANT CHANGE UP

## 2022-11-11 PROCEDURE — 88304 TISSUE EXAM BY PATHOLOGIST: CPT

## 2022-11-11 PROCEDURE — S2900: CPT

## 2022-11-11 PROCEDURE — 47563 LAPARO CHOLECYSTECTOMY/GRAPH: CPT

## 2022-11-11 PROCEDURE — 36415 COLL VENOUS BLD VENIPUNCTURE: CPT

## 2022-11-11 PROCEDURE — 47562 LAPAROSCOPIC CHOLECYSTECTOMY: CPT

## 2022-11-11 PROCEDURE — C1889: CPT

## 2022-11-11 PROCEDURE — 88304 TISSUE EXAM BY PATHOLOGIST: CPT | Mod: 26

## 2022-11-11 PROCEDURE — 47563 LAPARO CHOLECYSTECTOMY/GRAPH: CPT | Mod: AS

## 2022-11-11 PROCEDURE — S2900 ROBOTIC SURGICAL SYSTEM: CPT | Mod: NC

## 2022-11-11 DEVICE — LIGATING CLIPS WECK HEMOLOK POLYMER MEDIUM-LARGE (GREEN) 6: Type: IMPLANTABLE DEVICE | Status: FUNCTIONAL

## 2022-11-11 RX ORDER — SIMVASTATIN 20 MG/1
1 TABLET, FILM COATED ORAL
Qty: 0 | Refills: 0 | DISCHARGE

## 2022-11-11 RX ORDER — INDOCYANINE GREEN 25 MG
5 KIT INTRAVASCULAR; INTRAVENOUS ONCE
Refills: 0 | Status: COMPLETED | OUTPATIENT
Start: 2022-11-11 | End: 2022-11-11

## 2022-11-11 RX ORDER — SODIUM CHLORIDE 9 MG/ML
1000 INJECTION, SOLUTION INTRAVENOUS
Refills: 0 | Status: DISCONTINUED | OUTPATIENT
Start: 2022-11-11 | End: 2022-11-11

## 2022-11-11 RX ORDER — ONDANSETRON 8 MG/1
4 TABLET, FILM COATED ORAL ONCE
Refills: 0 | Status: COMPLETED | OUTPATIENT
Start: 2022-11-11 | End: 2022-11-11

## 2022-11-11 RX ORDER — DOCUSATE SODIUM 100 MG
1 CAPSULE ORAL
Qty: 20 | Refills: 0
Start: 2022-11-11 | End: 2022-11-15

## 2022-11-11 RX ORDER — OXYCODONE AND ACETAMINOPHEN 5; 325 MG/1; MG/1
1 TABLET ORAL ONCE
Refills: 0 | Status: DISCONTINUED | OUTPATIENT
Start: 2022-11-11 | End: 2022-11-11

## 2022-11-11 RX ORDER — CYST/ALA/Q10/PHOS.SER/DHA/BROC 100-20-50
1 POWDER (GRAM) ORAL
Qty: 0 | Refills: 0 | DISCHARGE

## 2022-11-11 RX ORDER — CEFAZOLIN SODIUM 1 G
2000 VIAL (EA) INJECTION ONCE
Refills: 0 | Status: COMPLETED | OUTPATIENT
Start: 2022-11-11 | End: 2022-11-11

## 2022-11-11 RX ORDER — OXYCODONE AND ACETAMINOPHEN 5; 325 MG/1; MG/1
1 TABLET ORAL
Qty: 14 | Refills: 0
Start: 2022-11-11

## 2022-11-11 RX ORDER — ESCITALOPRAM OXALATE 10 MG/1
1 TABLET, FILM COATED ORAL
Qty: 0 | Refills: 0 | DISCHARGE

## 2022-11-11 RX ORDER — HYDROMORPHONE HYDROCHLORIDE 2 MG/ML
0.5 INJECTION INTRAMUSCULAR; INTRAVENOUS; SUBCUTANEOUS
Refills: 0 | Status: DISCONTINUED | OUTPATIENT
Start: 2022-11-11 | End: 2022-11-11

## 2022-11-11 RX ORDER — CALCIUM CARBONATE 500(1250)
1 TABLET ORAL
Qty: 0 | Refills: 0 | DISCHARGE

## 2022-11-11 RX ORDER — METOCLOPRAMIDE HCL 10 MG
10 TABLET ORAL ONCE
Refills: 0 | Status: DISCONTINUED | OUTPATIENT
Start: 2022-11-11 | End: 2022-11-11

## 2022-11-11 RX ORDER — HYDROMORPHONE HYDROCHLORIDE 2 MG/ML
1 INJECTION INTRAMUSCULAR; INTRAVENOUS; SUBCUTANEOUS
Refills: 0 | Status: DISCONTINUED | OUTPATIENT
Start: 2022-11-11 | End: 2022-11-11

## 2022-11-11 RX ORDER — RISEDRONATE SODIUM 25.8; 4.2 MG/1; MG/1
1 TABLET, FILM COATED ORAL
Qty: 0 | Refills: 0 | DISCHARGE

## 2022-11-11 RX ORDER — IBUPROFEN 200 MG
1 TABLET ORAL
Qty: 25 | Refills: 0
Start: 2022-11-11

## 2022-11-11 RX ADMIN — INDOCYANINE GREEN 5 MILLIGRAM(S): KIT INTRAVASCULAR; INTRAVENOUS at 10:17

## 2022-11-11 RX ADMIN — SODIUM CHLORIDE 75 MILLILITER(S): 9 INJECTION, SOLUTION INTRAVENOUS at 10:17

## 2022-11-11 RX ADMIN — ONDANSETRON 4 MILLIGRAM(S): 8 TABLET, FILM COATED ORAL at 13:41

## 2022-11-11 RX ADMIN — HYDROMORPHONE HYDROCHLORIDE 0.5 MILLIGRAM(S): 2 INJECTION INTRAMUSCULAR; INTRAVENOUS; SUBCUTANEOUS at 13:40

## 2022-11-11 RX ADMIN — HYDROMORPHONE HYDROCHLORIDE 0.5 MILLIGRAM(S): 2 INJECTION INTRAMUSCULAR; INTRAVENOUS; SUBCUTANEOUS at 13:04

## 2022-11-11 RX ADMIN — HYDROMORPHONE HYDROCHLORIDE 0.5 MILLIGRAM(S): 2 INJECTION INTRAMUSCULAR; INTRAVENOUS; SUBCUTANEOUS at 13:08

## 2022-11-11 NOTE — ASU DISCHARGE PLAN (ADULT/PEDIATRIC) - NS MD DC FALL RISK RISK
For information on Fall & Injury Prevention, visit: [email or web address] OR  [email or web address] OR  https://www.cdc.gov/steadi/patient.html

## 2022-11-11 NOTE — ASU PREOP CHECKLIST - ISOLATION PRECAUTIONS
Learning About Asthma Triggers  What are asthma triggers? When you have asthma, some things can make your symptoms worse. These things are called triggers. Things that you're allergic to can trigger your asthma. These may include dust or dust mites, cockroach droppings, pet dander, or mold. Other things can also trigger your asthma, like smoke, colds or the flu, or air pollution. How do asthma triggers affect you? Triggers can make it harder for your lungs to work as they should. They can lead to sudden breathing problems and other symptoms. When you are around a trigger, an asthma attack is more likely. If your symptoms are severe, you may need emergency treatment or have to go to the hospital for treatment. What can you do to avoid triggers? The best way to avoid your asthma triggers is to know what your triggers are. When you are having symptoms, note the things around you that might be causing them. Then look for patterns that may be triggering your symptoms. Record your triggers on a piece of paper or in an asthma diary. When you have your list of possible triggers, work with your doctor to find ways to avoid them. Here are some ways to avoid a few common triggers. · Don't smoke or allow others to smoke around you. If you need help quitting, talk to your doctor about stop-smoking programs and medicines. These can increase your chances of quitting for good. · If there is a lot of pollution, pollen, or dust outside, stay at home and keep your windows closed. Use an air conditioner or air filter in your home. Check your local weather report or newspaper for air quality and pollen reports. · Avoid colds and flu. Get a flu vaccine every year, as soon as it's available. If you must be around people with colds or the flu, wash your hands often. · Talk to your doctor about getting a pneumococcal vaccine shot. If you have had one before, ask your doctor if you need a second dose.   To help prevent problems before they occur, take your controller medicine every day, not only when you have symptoms. Where can you learn more? Go to http://www.[employer].com/  Enter S900 in the search box to learn more about \"Learning About Asthma Triggers. \"  Current as of: October 26, 2020               Content Version: 12.8  © 8456-0256 [employer]. Care instructions adapted under license by [employer] (which disclaims liability or warranty for this information). If you have questions about a medical condition or this instruction, always ask your healthcare professional. [address] any warranty or liability for your use of this information. Get labs done soon. Ideally, nothing to eat after MN, but may drink water.    Lab opens 8 am. none

## 2022-11-11 NOTE — BRIEF OPERATIVE NOTE - NSICDXBRIEFPROCEDURE_GEN_ALL_CORE_FT
PROCEDURES:  Robot-assisted laparoscopic cholecystectomy using da Oscar Xi 11-Nov-2022 12:37:45  Kimberly Carson

## 2022-11-11 NOTE — ASU DISCHARGE PLAN (ADULT/PEDIATRIC) - CARE PROVIDER_API CALL
Rocco Pedraza)  Surgery  [address] [place] [id number]  Phone: [phone]  Fax: [phone]  Follow Up Time: 1 week

## 2022-11-14 PROBLEM — M27.0 DEVELOPMENTAL DISORDERS OF JAWS: Chronic | Status: ACTIVE | Noted: 2022-11-08

## 2022-11-14 PROBLEM — F41.9 ANXIETY DISORDER, UNSPECIFIED: Chronic | Status: ACTIVE | Noted: 2022-11-08

## 2022-11-14 PROBLEM — M81.0 AGE-RELATED OSTEOPOROSIS WITHOUT CURRENT PATHOLOGICAL FRACTURE: Chronic | Status: ACTIVE | Noted: 2022-11-08

## 2022-11-14 PROBLEM — E27.9 DISORDER OF ADRENAL GLAND, UNSPECIFIED: Chronic | Status: ACTIVE | Noted: 2022-11-08

## 2022-11-14 PROBLEM — E78.5 HYPERLIPIDEMIA, UNSPECIFIED: Chronic | Status: ACTIVE | Noted: 2022-11-07

## 2022-11-14 PROBLEM — H04.123 DRY EYE SYNDROME OF BILATERAL LACRIMAL GLANDS: Chronic | Status: ACTIVE | Noted: 2022-11-08

## 2022-11-14 PROBLEM — K80.12 CALCULUS OF GALLBLADDER WITH ACUTE AND CHRONIC CHOLECYSTITIS WITHOUT OBSTRUCTION: Chronic | Status: ACTIVE | Noted: 2022-11-07

## 2022-11-14 PROBLEM — U07.1 COVID-19: Chronic | Status: ACTIVE | Noted: 2022-11-07

## 2022-11-14 PROBLEM — Z78.9 OTHER SPECIFIED HEALTH STATUS: Chronic | Status: ACTIVE | Noted: 2022-11-07

## 2022-11-14 PROBLEM — K76.9 LIVER DISEASE, UNSPECIFIED: Chronic | Status: ACTIVE | Noted: 2022-11-08

## 2022-11-14 LAB — SURGICAL PATHOLOGY STUDY: SIGNIFICANT CHANGE UP

## 2022-11-17 ENCOUNTER — APPOINTMENT (OUTPATIENT)
Dept: SURGERY | Facility: CLINIC | Age: 77
End: 2022-11-17

## 2022-11-17 PROCEDURE — 99024 POSTOP FOLLOW-UP VISIT: CPT

## 2022-11-17 NOTE — PHYSICAL EXAM
[JVD] : no jugular venous distention  [Normal Thyroid] : the thyroid was normal [Carotid Bruits] : no carotid bruits [Normal Breath Sounds] : Normal breath sounds [Normal Heart Sounds] : normal heart sounds [Normal Rate and Rhythm] : normal rate and rhythm [Calm] : calm [de-identified] : well developed female in no distress [de-identified] : nonicteric [de-identified] : without adenopathy [de-identified] : normal bowel sounds, without distension or tenderness.\par Incisions clean and closed [de-identified] : without calf pain or swelling

## 2022-11-17 NOTE — HISTORY OF PRESENT ILLNESS
[de-identified] : s/p Robotic Assist Laparoscopic Cholecystectomy 11/11/2022 [de-identified] : 77 year old female presents for follow-up.  Patient denies pain, swelling or wound drainage.  Patient denies fever, chills and is having

## 2022-12-19 ENCOUNTER — APPOINTMENT (OUTPATIENT)
Dept: SURGERY | Facility: CLINIC | Age: 77
End: 2022-12-19

## 2022-12-19 VITALS — TEMPERATURE: 97.2 F

## 2022-12-19 PROCEDURE — 99024 POSTOP FOLLOW-UP VISIT: CPT

## 2022-12-19 NOTE — HISTORY OF PRESENT ILLNESS
[de-identified] : cholecystitis and lithiasis, hx of hepatic mass(hemangioma?), s/p robotic assisted laparoscopic cholecystectomy on 11/11/22 [de-identified] : Pt doing well, normal GI functioning, denies any fevers or chills.

## 2022-12-19 NOTE — PHYSICAL EXAM
[Normal Breath Sounds] : Normal breath sounds [Wheezing] : wheezing was heard [Normal Heart Sounds] : normal heart sounds [Normal Rate and Rhythm] : normal rate and rhythm [Calm] : calm [de-identified] : well developed white female in no distress [de-identified] : Nonicteric [de-identified] : without adenopathy [de-identified] : normal bowel sounds, without distension or tenderness.\par Incisions clean and closed. [de-identified] : without calf pain or swelling

## 2023-01-09 ENCOUNTER — NON-APPOINTMENT (OUTPATIENT)
Age: 78
End: 2023-01-09

## 2023-01-09 ENCOUNTER — APPOINTMENT (OUTPATIENT)
Dept: OTOLARYNGOLOGY | Facility: CLINIC | Age: 78
End: 2023-01-09
Payer: MEDICARE

## 2023-01-09 VITALS — HEART RATE: 77 BPM | BODY MASS INDEX: 21.93 KG/M2 | HEIGHT: 64.5 IN | WEIGHT: 130 LBS

## 2023-01-09 DIAGNOSIS — J31.2 CHRONIC PHARYNGITIS: ICD-10-CM

## 2023-01-09 DIAGNOSIS — J38.7 OTHER DISEASES OF LARYNX: ICD-10-CM

## 2023-01-09 DIAGNOSIS — H91.90 UNSPECIFIED HEARING LOSS, UNSPECIFIED EAR: ICD-10-CM

## 2023-01-09 DIAGNOSIS — M54.2 CERVICALGIA: ICD-10-CM

## 2023-01-09 DIAGNOSIS — J38.4 EDEMA OF LARYNX: ICD-10-CM

## 2023-01-09 DIAGNOSIS — T70.0XXA OTITIC BAROTRAUMA, INITIAL ENCOUNTER: ICD-10-CM

## 2023-01-09 DIAGNOSIS — H60.90 UNSPECIFIED OTITIS EXTERNA, UNSPECIFIED EAR: ICD-10-CM

## 2023-01-09 PROCEDURE — 31575 DIAGNOSTIC LARYNGOSCOPY: CPT

## 2023-01-09 PROCEDURE — 92557 COMPREHENSIVE HEARING TEST: CPT

## 2023-01-09 PROCEDURE — 99204 OFFICE O/P NEW MOD 45 MIN: CPT | Mod: 25

## 2023-01-09 PROCEDURE — 92567 TYMPANOMETRY: CPT

## 2023-01-09 NOTE — HISTORY OF PRESENT ILLNESS
[de-identified] : Pt presents today c/o throat pain. Pt notes she had gallbladder surgery 11/11/22 (removed) and she was fine for 1 month. Pt notes she started to get cough and sore throat and went to urgent care where they told her it is a virus and didn't give her anything. Pt notes she went to PCP 12/2 and was given Tessilon which she notes took cough away. Pt notes 12/4 she wasn't feeling well still and was given cepharoxin (10 days) and prednisone by PCP. Pt notes 12/21 went to PCP because she still had some symptoms, PCP told her her ear and throat were red given Cefdinir (10 day course). Pt notes she went on an 11 day cruise on 12/25. Pt notes on the cruise she started having the same pain around her gallbladder that she had prior to the surgery. Pt notes her sore throat switched from her right to left side and the pain is come and go. Pt notes when she moves her tongue she gets pain in her cheek. Pt notes she experiences PND, headaches, and a little dry cough. Pt notes she might be flying next week. Pt notes her hearing has been gradually getting worse.

## 2023-01-09 NOTE — PHYSICAL EXAM
[Midline] : trachea located in midline position [Normal] : orientation to person, place, and time: normal [de-identified] : mild tmj tenderness [FreeTextEntry8] : cerumen removed via curettage  [FreeTextEntry9] : cerumen removed via curettage  [FreeTextEntry5] : no response to tuning fork [de-identified] : posterior pharyngeal wall inflamed [FreeTextEntry2] : sinuses nontender to percussion.

## 2023-01-09 NOTE — CONSULT LETTER
[Dear  ___] : Dear  [unfilled], [Consult Letter:] : I had the pleasure of evaluating your patient, [unfilled]. [Please see my note below.] : Please see my note below. [Consult Closing:] : Thank you very much for allowing me to participate in the care of this patient.  If you have any questions, please do not hesitate to contact me. [Sincerely,] : Sincerely, [FreeTextEntry3] : Juan Alberto Gifford MD FACS

## 2023-01-09 NOTE — REVIEW OF SYSTEMS
[Sneezing] : sneezing [Ear Pain] : ear pain [Ear Itch] : ear itch [Sinus Pain] : sinus pain [Sinus Pressure] : sinus pressure [Throat Pain] : throat pain [Negative] : Heme/Lymph

## 2023-01-09 NOTE — DATA REVIEWED
[de-identified] : Type A tymps AU; abnormal ETF\par WNL/borderline hearing 250-1000 Hz, mild to severe SNHL, 2455-2530 Hz, AU\par REC: ENT f/u, re-eval per MD, consider AU hearing aids

## 2023-01-09 NOTE — ADDENDUM
[FreeTextEntry1] : Documented by Mey Ruiz acting as scribe for Dr. Gifford on 01/09/2023.\par \par All Medical record entries made by the scribe were at my, Dr. Gifford,direction and personally dictated by me on 01/09/2023. I have reviewed the chart and agree that the record accurately reflects my personal performance of the history, physical exam, assessment and plan. I have also personally directed, reviewed, and agreed with the discharge instructions.

## 2023-01-09 NOTE — ASSESSMENT
[FreeTextEntry1] : Reviewed and reconciled medications, allergies, PMHx, PSHx, SocHx, FMHx.\par \par presents today c/o left sore throat, PND, headaches\par \par Physical Exam -\par cerumen removed b/l, no response to tuning fork\par minimal tmj tenderness\par posterior pharyngeal wall inflamed\par \par Flexible laryngoscopy \par clear to nasopharynx, mild erythema in nasopharynx, foamy white mucus in nasopharynx, BOT normal, Edema of postcricoid, arytenoids and interarytenoids, pooling in left piriform\par \par Audio:\par Type A tymps AU; abnormal ETF\par WNL/borderline hearing 250-1000 Hz, mild to severe SNHL, 6203-9410 Hz, AU\par right: 96% discrim at 65db, TPP -7\par left: 96% discrim at 60db, TPP 1\par \par Plan:\par Cerumen removed. Flexible laryngoscopy. Audio - results interpreted by Dr. Gifford and reviewed with the patient. Reflux diet sheet and TMJ information sheet provided. Afrin 30 minutes before flying. Flonase - 2 sprays bilaterally QD, spray laterally. Start Dioxirinse BID.  If pt  doesn't see relief after trying diet, scans will be needed. FU prn.

## 2023-01-09 NOTE — PROCEDURE
[None] : none [Flexible Endoscope] : examined with the flexible endoscope [de-identified] : Procedure: Flexible Fiberoptic Laryngoscopy: Risks, benefits, and alternatives of flexible laryngoscopy were explained to the patient. The patient gave oral consent to proceed. The flexible scope was inserted into the left nasal cavity and advanced towards the nasopharynx. Visualized mucosa over the turbinates and septum were as described as above. clear to nasopharynx, mild erythema in nasopharynx, foamy white mucus in nasopharynx. Oropharyngeal walls were symmetric and mobile without lesion, mass, or edema. Hypopharynx was also without lesion or edema. Larynx was mobile without lesions. Supraglottic structures were free of edema, mass, and asymmetry. Edema of postcricoid, arytenoids and interarytenoids, pooling in left piriform. Base of tongue was within normal limits. \par  [de-identified] : left throat pain

## 2023-01-10 ENCOUNTER — APPOINTMENT (OUTPATIENT)
Dept: SURGERY | Facility: CLINIC | Age: 78
End: 2023-01-10
Payer: MEDICARE

## 2023-01-10 VITALS — TEMPERATURE: 96.7 F

## 2023-01-10 DIAGNOSIS — J31.0 CHRONIC RHINITIS: ICD-10-CM

## 2023-01-10 DIAGNOSIS — R09.82 POSTNASAL DRIP: ICD-10-CM

## 2023-01-10 PROCEDURE — 99024 POSTOP FOLLOW-UP VISIT: CPT

## 2023-01-10 RX ORDER — IPRATROPIUM BROMIDE 42 UG/1
0.06 SPRAY NASAL
Qty: 1 | Refills: 5 | Status: ACTIVE | COMMUNITY
Start: 2023-01-10 | End: 1900-01-01

## 2023-01-10 RX ORDER — DICYCLOMINE HYDROCHLORIDE 20 MG/1
20 TABLET ORAL
Qty: 20 | Refills: 0 | Status: ACTIVE | COMMUNITY
Start: 2023-01-10 | End: 1900-01-01

## 2023-01-10 NOTE — ASSESSMENT
[FreeTextEntry1] : I sent pt for lab and a sonogram.  All labs including her LFTS, amylase and lipase was normal. Her sonogram showed no collections and normal ducts

## 2023-01-10 NOTE — HISTORY OF PRESENT ILLNESS
[de-identified] : cholecystitis and lithiasis, hx of hepatic mass(hemangioma?), s/p robotic assisted laparoscopic cholecystectomy on 11/11/22 [de-identified] : Pt states she was on a cruise and developed episodes of RUQ pain that radiated to her back in the PM after big meals. She denies any fevers or chills. Without nausea or vomiting or changes in her bowel habits.

## 2023-01-10 NOTE — PHYSICAL EXAM
[Normal Breath Sounds] : Normal breath sounds [Normal Heart Sounds] : normal heart sounds [Calm] : calm [de-identified] : well developed female in no acute distress [de-identified] : nonicteric [de-identified] : without adenpathy [de-identified] : normal bowel sounds, without distension or tenderness\par Incisions clean and closed, [de-identified] : without calf pain or swelling

## 2023-02-02 ENCOUNTER — APPOINTMENT (OUTPATIENT)
Dept: SURGERY | Facility: CLINIC | Age: 78
End: 2023-02-02
Payer: MEDICARE

## 2023-02-02 VITALS — TEMPERATURE: 97.3 F

## 2023-02-02 DIAGNOSIS — Z90.49 ACQUIRED ABSENCE OF OTHER SPECIFIED PARTS OF DIGESTIVE TRACT: ICD-10-CM

## 2023-02-02 PROCEDURE — 99024 POSTOP FOLLOW-UP VISIT: CPT

## 2023-02-02 NOTE — HISTORY OF PRESENT ILLNESS
[de-identified] : s/p RA Laparoscopic Cholecystectomy 11/11/2022 [de-identified] : Patient is feeling better.  Patient denies RUQ pain, fever, chills, nausea and vomiting.  Patient on a low-fat diet presently.\par \par Patient also with h/o Hepatic Mass ? hemangioma noted on Ab US 10/03/2022.  Follow up Ab US 01/10/2023: Left Hepatic lobe hemangioma, no significant change.  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 She denies any fevers or chills. Without nausea or vomiting or changes in her bowel habits.   \par \par

## 2023-02-02 NOTE — PHYSICAL EXAM
[Calm] : calm [de-identified] : well developed female in no acute distress, nonicteric [de-identified] : + Bowel sounds, soft, non-tender

## 2023-06-13 PROBLEM — K80.20 GALLSTONES: Status: RESOLVED | Noted: 2023-06-13 | Resolved: 2023-06-13

## 2023-06-13 NOTE — DATA REVIEWED
[No studies available for review at this time.] : No studies available for review at this time. [FreeTextEntry1] : Abdominal sonogram\par 10/3/2022\par "gallbladder: there are a few shadowing gallstones within the gallbladder measuring up to 2 cm."

## 2023-06-13 NOTE — PLAN
[FreeTextEntry1] : History suggestive of biliary colic, though non specific.  No pain today somewhat reassuring.\par Abdominal examination benign.  Sonography with gallstones but no acute gallbladder findings.\par Distant prior MRI noted.\par May benefit from interval imaging study as CT/ MRI to further delineate old lesion and rule out new pathology.\par In interim, counseled as to smaller and more frequent meals with emphasis on lower fat food items.\par They are pleased and agree.\par Should further swetha RUQ pain present, especially in setting of post prandial period, then timely elective cholecystectomy would be advised.\par They are pleased and agree.\par \par Please note that more than 50% of face to face time was spent in counseling and coordination of care.

## 2023-06-13 NOTE — REVIEW OF SYSTEMS
[Feeling Poorly] : not feeling poorly [Feeling Tired] : not feeling tired [As Noted in HPI] : as noted in HPI [Anxiety] : anxiety [Depression] : no depression [Negative] : Heme/Lymph [FreeTextEntry9] : "osteoporosis..." [de-identified] : regarding this issue and visit...

## 2023-06-13 NOTE — HISTORY OF PRESENT ILLNESS
[de-identified] : Exceptionally pleasant lady presenting to the office today in the company of her .\par She reports being in excellent health but experienced a transient episode of epigastric discomfort with ? radiation to the right.\par Subsequent to this an abdominal sonogram was done which confirmed gallstones.\par Today she is in no pain.\par A distant MRI of the abdomen was done for a possible hepatic mass/ hemangioma...

## 2023-06-13 NOTE — PHYSICAL EXAM
[Respiratory Effort] : normal respiratory effort [Normal Rate and Rhythm] : normal rate and rhythm [No HSM] : no hepatosplenomegaly [Tender] : was nontender [Enlarged] : not enlarged [No Rash or Lesion] : No rash or lesion [Alert] : alert [Oriented to Person] : oriented to person [Oriented to Place] : oriented to place [Oriented to Time] : oriented to time [Anxious] : anxious [de-identified] : Appears well, no acute distress, ambulates easily into office and assumes examination table without need of assistance.  [de-identified] : Normocephalic and atraumatic.  [de-identified] : Supple with full range of motion.  [FreeTextEntry1] : No cervical, supraclavicular, axillary or inguinal adenopathy.  [de-identified] : Deferred.  [de-identified] : RUQ without visible fullness.\par Epigastrium without palpable fullness.\par Entire abdomen non tender.\par Periumbilical fascia intact.\par Lower abdomen without hernias. [de-identified] : Normal external genitalia. [de-identified] : Deferred.  [de-identified] : Grossly symmetric and within normal limits without motor or sensory deficits.  [de-identified] : though quite appropriately so and pleasant interactive overall...

## 2024-08-25 ENCOUNTER — NON-APPOINTMENT (OUTPATIENT)
Age: 79
End: 2024-08-25

## 2024-08-26 ENCOUNTER — APPOINTMENT (OUTPATIENT)
Dept: OTOLARYNGOLOGY | Facility: CLINIC | Age: 79
End: 2024-08-26
Payer: MEDICARE

## 2024-08-26 VITALS
DIASTOLIC BLOOD PRESSURE: 66 MMHG | HEIGHT: 64.5 IN | WEIGHT: 134.13 LBS | SYSTOLIC BLOOD PRESSURE: 105 MMHG | HEART RATE: 79 BPM | BODY MASS INDEX: 22.62 KG/M2

## 2024-08-26 DIAGNOSIS — J31.0 CHRONIC RHINITIS: ICD-10-CM

## 2024-08-26 DIAGNOSIS — H90.5 UNSPECIFIED SENSORINEURAL HEARING LOSS: ICD-10-CM

## 2024-08-26 DIAGNOSIS — H60.63 UNSPECIFIED CHRONIC OTITIS EXTERNA, BILATERAL: ICD-10-CM

## 2024-08-26 DIAGNOSIS — H90.3 SENSORINEURAL HEARING LOSS, BILATERAL: ICD-10-CM

## 2024-08-26 PROCEDURE — 92567 TYMPANOMETRY: CPT

## 2024-08-26 PROCEDURE — 92557 COMPREHENSIVE HEARING TEST: CPT

## 2024-08-26 PROCEDURE — 99213 OFFICE O/P EST LOW 20 MIN: CPT

## 2024-08-26 RX ORDER — MONTELUKAST SODIUM 10 MG/1
TABLET, FILM COATED ORAL
Refills: 0 | Status: ACTIVE | COMMUNITY

## 2024-08-26 RX ORDER — RISEDRONATE SODIUM 150 MG/1
150 TABLET, FILM COATED ORAL
Refills: 0 | Status: ACTIVE | COMMUNITY

## 2024-08-26 NOTE — ASSESSMENT
[FreeTextEntry1] : Reviewed and reconciled medications, allergies, PMHx, PSHx, SocHx, FMHx.  Patient with h/o HL, neck pain, otalgia, OE, and chronic pharyngitis presents today stating that her ears feel clogged. She also states that she thinks that her hearing has gotten worse.  Audio 1/2023: -96% discrimination at 65 dB AU   Physical exam: -right ear canal: cerumen removed via curettage -left ear canal: cerumen removed via curettage -no response to tuning forks -submandibular glands firm, but symmetric -deviated septum right without obstruction -mildly inflamed turbinates  Audio 8/26/24: -Type A Tymps AU -mild to moderately severe SNHL 250-8000 Hz AU -88% discrimination AD and 84% discrimination AS at 70 dB -right TPP: -8 -left TPP: -18   Plan:  Audio - results interpreted by Dr. Gifford and reviewed with the patient. -Consider bilateral amplification -FU in 1 year

## 2024-08-26 NOTE — ADDENDUM
[FreeTextEntry1] :  Documented by Simba Mejia acting as scribe for Dr. Gifford on 08/26/2024. All Medical record entries made by the Scribe were at my, Dr. Gifford, direction and personally dictated by me on 08/26/2024 . I have reviewed the chart and agree that the record accurately reflects my personal performance of the history, physical exam, assessment and plan. I have also personally directed, reviewed, and agreed with the discharge instructions.

## 2024-08-26 NOTE — PHYSICAL EXAM
[] : septum deviated to the right [Midline] : trachea located in midline position [Normal] : no rashes [de-identified] : submandibular glands firm, but symmetric [Hearing Loss Right Only] : normal [Hearing Loss Left Only] : normal [FreeTextEntry8] :  cerumen removed via curettage [FreeTextEntry9] :  cerumen removed via curettage [FreeTextEntry5] : no response to tuning forks [de-identified] :  mildly inflamed turbinates [FreeTextEntry2] :  sinuses nontender to percussion.  sensations intact

## 2024-08-26 NOTE — DATA REVIEWED
[de-identified] : Audio 8/26/24: -Type A Tymps AU -mild to moderately severe SNHL 250-8000 Hz AU -88% discrimination AD and 84% discrimination AS at 70 dB -right TPP: -8 -left TPP: -18  Audio 1/2023: -96% discrimination at 65 dB AU

## 2024-08-26 NOTE — HISTORY OF PRESENT ILLNESS
[de-identified] : Patient with h/o HL, neck pain, otalgia, OE, and chronic pharyngitis presents today stating that her ears feel clogged. She also states that she thinks that her hearing has gotten worse.

## 2024-08-26 NOTE — CONSULT LETTER
[Dear  ___] : Dear  [unfilled], [Courtesy Letter:] : I had the pleasure of seeing your patient, [unfilled], in my office today. [Please see my note below.] : Please see my note below. [Consult Closing:] : Thank you very much for allowing me to participate in the care of this patient.  If you have any questions, please do not hesitate to contact me. [Sincerely,] : Sincerely, [FreeTextEntry3] :  Juan Alberto Gifford MD FACS

## 2024-12-11 ENCOUNTER — APPOINTMENT (OUTPATIENT)
Dept: BREAST CENTER | Facility: CLINIC | Age: 79
End: 2024-12-11
Payer: MEDICARE

## 2024-12-11 VITALS
DIASTOLIC BLOOD PRESSURE: 78 MMHG | BODY MASS INDEX: 22.26 KG/M2 | WEIGHT: 132 LBS | HEART RATE: 80 BPM | HEIGHT: 64.5 IN | SYSTOLIC BLOOD PRESSURE: 128 MMHG

## 2024-12-11 DIAGNOSIS — Z80.3 FAMILY HISTORY OF MALIGNANT NEOPLASM OF BREAST: ICD-10-CM

## 2024-12-11 DIAGNOSIS — Z80.8 FAMILY HISTORY OF MALIGNANT NEOPLASM OF OTHER ORGANS OR SYSTEMS: ICD-10-CM

## 2024-12-11 DIAGNOSIS — Z80.0 FAMILY HISTORY OF MALIGNANT NEOPLASM OF DIGESTIVE ORGANS: ICD-10-CM

## 2024-12-11 DIAGNOSIS — R22.9 LOCALIZED SWELLING, MASS AND LUMP, UNSPECIFIED: ICD-10-CM

## 2024-12-11 PROCEDURE — 99214 OFFICE O/P EST MOD 30 MIN: CPT

## 2024-12-11 RX ORDER — BACILLUS COAGULANS/INULIN 1B-250 MG
CAPSULE ORAL
Refills: 0 | Status: ACTIVE | COMMUNITY

## 2024-12-18 NOTE — ED ADULT NURSE NOTE - TEMPLATE LIST FOR HEAD TO TOE ASSESSMENT
Additional Notes: Discussed kenalog injection if scar not flat in a few months
Render Risk Assessment In Note?: no
Detail Level: Simple
Neuro

## 2025-02-11 NOTE — ASU PATIENT PROFILE, ADULT - IS PATIENT PREGNANT?
Giselle Serra on 2/19/25 @ 8006.    no

## 2025-05-16 DIAGNOSIS — R92.8 OTHER ABNORMAL AND INCONCLUSIVE FINDINGS ON DIAGNOSTIC IMAGING OF BREAST: ICD-10-CM

## 2025-05-19 ENCOUNTER — NON-APPOINTMENT (OUTPATIENT)
Age: 80
End: 2025-05-19

## (undated) DEVICE — XI DRAPE COLUMN

## (undated) DEVICE — XI SEAL UNIV 5- 8 MM

## (undated) DEVICE — LIGASURE MARYLAND 37CM

## (undated) DEVICE — D HELP - CLEARVIEW CLEARIFY SYSTEM

## (undated) DEVICE — SOL IRR POUR H2O 1000ML

## (undated) DEVICE — SOL IRR BAG NS 0.9% 1000ML

## (undated) DEVICE — SOL IRR POUR NS 0.9% 1000ML

## (undated) DEVICE — ENDOCATCH 10MM SPECIMEN POUCH

## (undated) DEVICE — DRAPE TOWEL BLUE 17" X 24"

## (undated) DEVICE — XI 12MM AND STAPLER CANNULA SEAL

## (undated) DEVICE — SUT POLYSORB 0 30" GU-46

## (undated) DEVICE — DRAPE 3/4 SHEET W REINFORCEMENT 56X77"

## (undated) DEVICE — XI DRAPE ARM

## (undated) DEVICE — TROCAR COVIDIEN VERSAONE BLUNT TIP HASSAN 12MM

## (undated) DEVICE — SUT POLYSORB 2-0 30" V-20 UNDYED

## (undated) DEVICE — XI ENDOWRIST SUCTION IRRIGATOR 8MM

## (undated) DEVICE — SUT MONOCRYL 4-0 27" PS-2 UNDYED

## (undated) DEVICE — DRSG CURITY GAUZE SPONGE 4 X 4" 12-PLY

## (undated) DEVICE — DRSG OPSITE 2.5 X 2"

## (undated) DEVICE — ELCTR BOVIE TIP BLADE INSULATED 2.75" EDGE

## (undated) DEVICE — PACK GENERAL LAPAROSCOPY

## (undated) DEVICE — GLV 8 PROTEXIS (WHITE)

## (undated) DEVICE — BLADE SCALPEL SAFETYLOCK #15

## (undated) DEVICE — VENODYNE/SCD SLEEVE CALF LARGE

## (undated) DEVICE — XI TIP COVER

## (undated) DEVICE — NDL HYPO SAFE 25G X 1.5" (ORANGE)

## (undated) DEVICE — XI ENDOWRIST 12 - 8 MM CANNULA REDUCER

## (undated) DEVICE — SMOKE EVACUTATION SYS LAPROSCOPIC AC/PA

## (undated) DEVICE — VENODYNE/SCD SLEEVE CALF MEDIUM

## (undated) DEVICE — TUBING STRYKER PNEUMOCLEAR HIGH FLOW

## (undated) DEVICE — SOL IRR BAG NS 0.9% 3000ML

## (undated) DEVICE — WARMING BLANKET UPPER ADULT

## (undated) DEVICE — DRSG MASTISOL

## (undated) DEVICE — PLV-SCD MACHINE: Type: DURABLE MEDICAL EQUIPMENT

## (undated) DEVICE — XI OBTURATOR OPTICAL BLADELESS 8MM